# Patient Record
Sex: MALE | Race: BLACK OR AFRICAN AMERICAN
[De-identification: names, ages, dates, MRNs, and addresses within clinical notes are randomized per-mention and may not be internally consistent; named-entity substitution may affect disease eponyms.]

---

## 2019-09-18 LAB
BASOPHILS # BLD AUTO: 0 10*3/UL (ref 0–0.1)
BASOPHILS NFR BLD AUTO: 0.6 % (ref 0–1)
DEPRECATED NEUTROPHILS # BLD AUTO: 2.5 10*3/UL (ref 2.1–6.9)
EOSINOPHIL # BLD AUTO: 0.1 10*3/UL (ref 0–0.4)
EOSINOPHIL NFR BLD AUTO: 2.4 % (ref 0–6)
ERYTHROCYTE [DISTWIDTH] IN CORD BLOOD: 13.1 % (ref 11.7–14.4)
HCT VFR BLD AUTO: 51.2 % (ref 38.2–49.6)
HGB BLD-MCNC: 17.1 G/DL (ref 14–18)
LYMPHOCYTES # BLD: 1.6 10*3/UL (ref 1–3.2)
LYMPHOCYTES NFR BLD AUTO: 32.1 % (ref 18–39.1)
MCH RBC QN AUTO: 29.8 PG (ref 28–32)
MCHC RBC AUTO-ENTMCNC: 33.4 G/DL (ref 31–35)
MCV RBC AUTO: 89.2 FL (ref 81–99)
MONOCYTES # BLD AUTO: 0.7 10*3/UL (ref 0.2–0.8)
MONOCYTES NFR BLD AUTO: 14.3 % (ref 4.4–11.3)
NEUTS SEG NFR BLD AUTO: 50.4 % (ref 38.7–80)
PLATELET # BLD AUTO: 200 X10E3/UL (ref 140–360)
RBC # BLD AUTO: 5.74 X10E6/UL (ref 4.3–5.7)

## 2019-09-23 ENCOUNTER — HOSPITAL ENCOUNTER (OUTPATIENT)
Dept: HOSPITAL 88 - OR | Age: 79
Discharge: HOME | End: 2019-09-23
Attending: INTERNAL MEDICINE
Payer: MEDICARE

## 2019-09-23 VITALS — DIASTOLIC BLOOD PRESSURE: 75 MMHG | SYSTOLIC BLOOD PRESSURE: 110 MMHG

## 2019-09-23 DIAGNOSIS — K64.8: ICD-10-CM

## 2019-09-23 DIAGNOSIS — K63.5: ICD-10-CM

## 2019-09-23 DIAGNOSIS — I10: ICD-10-CM

## 2019-09-23 DIAGNOSIS — Z01.810: ICD-10-CM

## 2019-09-23 DIAGNOSIS — K57.30: ICD-10-CM

## 2019-09-23 DIAGNOSIS — D12.2: ICD-10-CM

## 2019-09-23 DIAGNOSIS — Z12.11: Primary | ICD-10-CM

## 2019-09-23 DIAGNOSIS — Z01.812: ICD-10-CM

## 2019-09-23 DIAGNOSIS — Z86.010: ICD-10-CM

## 2019-09-23 PROCEDURE — 45380 COLONOSCOPY AND BIOPSY: CPT

## 2019-09-23 PROCEDURE — 88305 TISSUE EXAM BY PATHOLOGIST: CPT

## 2019-09-23 PROCEDURE — 45385 COLONOSCOPY W/LESION REMOVAL: CPT

## 2019-09-23 PROCEDURE — 85025 COMPLETE CBC W/AUTO DIFF WBC: CPT

## 2019-09-23 PROCEDURE — 93005 ELECTROCARDIOGRAM TRACING: CPT

## 2019-09-23 PROCEDURE — 45384 COLONOSCOPY W/LESION REMOVAL: CPT

## 2019-09-23 PROCEDURE — 45378 DIAGNOSTIC COLONOSCOPY: CPT

## 2019-09-23 PROCEDURE — 36415 COLL VENOUS BLD VENIPUNCTURE: CPT

## 2019-09-23 NOTE — OPERATIVE REPORT
DATE OF PROCEDURE:  09/23/2019

 

SURGEON:  Jama Brooks MD

 

PROCEDURE:  Colonoscopy and polypectomy.

 

INDICATIONS FOR COLONOSCOPY:  Surveillance colonoscopy, personal history of colon polyps.

 

MEDICATIONS:  The patient was done under MAC, please see anesthesiologist's note.

 

PROCEDURE IN DETAIL:  With the patient in the left lateral decubitus position, the

flexible fiberoptic Olympus colonoscope was inserted into the rectum with ease and

advanced all the way to the cecum.  It was then withdrawn slowly.  Mucosa overlying the

cecum appeared to be within normal limits.  A minute polyp in the ascending colon was

removed per the cold biopsy forceps.  The rest of the ascending and transverse appeared

to be within normal limits.  One polyp was snared from the descending colon.  Some

diverticular disease was noted in the sigmoid colon.  Two polyps were hot biopsied from

the sigmoid colon.  The rectum appeared to be within normal limits.  The scope was then

retroflexed into the distal rectum.  A large internal hemorrhoids were noted, none of

which was actively bleeding.  The scope was then straightened out and was subsequently

withdrawn.  The patient tolerated the procedure well. 

 

IMPRESSION:  

1. Ascending colon polyp removed per cold biopsy forceps.

2. Descending colon polyp snared.

3. Diverticulosis.

4. Sigmoid colon polyps x2 hot biopsied.

5. Internal hemorrhoids, none actively bleeding.

 

PLAN:  Follow up histology.  Initiate high-fiber low-fat diet.  Initiate high-fiber

supplement.  The patient might benefit from a followup colonoscopy in 3 years. 

 

 

 

 

______________________________

Jama Brooks MD

 

[facility] [place]

D:  09/23/2019 12:12:40

T:  09/23/2019 18:21:22

Job #:  [id number]

 

cc:            David Graves MD

## 2019-09-25 ENCOUNTER — HOSPITAL ENCOUNTER (OUTPATIENT)
Dept: HOSPITAL 88 - ER | Age: 79
Setting detail: OBSERVATION
LOS: 1 days | Discharge: HOME | End: 2019-09-26
Attending: INTERNAL MEDICINE | Admitting: INTERNAL MEDICINE
Payer: MEDICARE

## 2019-09-25 VITALS — SYSTOLIC BLOOD PRESSURE: 144 MMHG | DIASTOLIC BLOOD PRESSURE: 65 MMHG

## 2019-09-25 VITALS — DIASTOLIC BLOOD PRESSURE: 89 MMHG | SYSTOLIC BLOOD PRESSURE: 135 MMHG

## 2019-09-25 VITALS — SYSTOLIC BLOOD PRESSURE: 149 MMHG | DIASTOLIC BLOOD PRESSURE: 89 MMHG

## 2019-09-25 VITALS — SYSTOLIC BLOOD PRESSURE: 158 MMHG | DIASTOLIC BLOOD PRESSURE: 88 MMHG

## 2019-09-25 VITALS — DIASTOLIC BLOOD PRESSURE: 88 MMHG | SYSTOLIC BLOOD PRESSURE: 158 MMHG

## 2019-09-25 VITALS — WEIGHT: 190.37 LBS | BODY MASS INDEX: 29.88 KG/M2 | HEIGHT: 67 IN

## 2019-09-25 DIAGNOSIS — E03.9: ICD-10-CM

## 2019-09-25 DIAGNOSIS — K21.9: ICD-10-CM

## 2019-09-25 DIAGNOSIS — K91.840: Primary | ICD-10-CM

## 2019-09-25 DIAGNOSIS — F17.220: ICD-10-CM

## 2019-09-25 DIAGNOSIS — Z86.010: ICD-10-CM

## 2019-09-25 DIAGNOSIS — I10: ICD-10-CM

## 2019-09-25 DIAGNOSIS — E78.5: ICD-10-CM

## 2019-09-25 LAB
ALBUMIN SERPL-MCNC: 3.5 G/DL (ref 3.5–5)
ALBUMIN/GLOB SERPL: 1 {RATIO} (ref 0.8–2)
ALP SERPL-CCNC: 112 IU/L (ref 40–150)
ALT SERPL-CCNC: 17 IU/L (ref 0–55)
ANION GAP SERPL CALC-SCNC: 14.8 MMOL/L (ref 8–16)
BASOPHILS # BLD AUTO: 0 10*3/UL (ref 0–0.1)
BASOPHILS NFR BLD AUTO: 0.5 % (ref 0–1)
BUN SERPL-MCNC: 19 MG/DL (ref 7–26)
BUN/CREAT SERPL: 18 (ref 6–25)
CALCIUM SERPL-MCNC: 9.6 MG/DL (ref 8.4–10.2)
CHLORIDE SERPL-SCNC: 102 MMOL/L (ref 98–107)
CO2 SERPL-SCNC: 24 MMOL/L (ref 22–29)
DEPRECATED APTT PLAS QN: 46.8 SECONDS (ref 23.8–35.5)
DEPRECATED INR PLAS: 0.93
DEPRECATED NEUTROPHILS # BLD AUTO: 2.5 10*3/UL (ref 2.1–6.9)
EGFRCR SERPLBLD CKD-EPI 2021: > 60 ML/MIN (ref 60–?)
EOSINOPHIL # BLD AUTO: 0.1 10*3/UL (ref 0–0.4)
EOSINOPHIL NFR BLD AUTO: 1.5 % (ref 0–6)
ERYTHROCYTE [DISTWIDTH] IN CORD BLOOD: 12.9 % (ref 11.7–14.4)
GLOBULIN PLAS-MCNC: 3.6 G/DL (ref 2.3–3.5)
GLUCOSE SERPLBLD-MCNC: 116 MG/DL (ref 74–118)
HCT VFR BLD AUTO: 44.9 % (ref 38.2–49.6)
HCT VFR BLD AUTO: 44.9 % (ref 38.2–49.6)
HGB BLD-MCNC: 15.5 G/DL (ref 14–18)
HGB BLD-MCNC: 16 G/DL (ref 14–18)
LYMPHOCYTES # BLD: 0.9 10*3/UL (ref 1–3.2)
LYMPHOCYTES NFR BLD AUTO: 22.1 % (ref 18–39.1)
MCH RBC QN AUTO: 30.6 PG (ref 28–32)
MCHC RBC AUTO-ENTMCNC: 35.6 G/DL (ref 31–35)
MCV RBC AUTO: 85.9 FL (ref 81–99)
MONOCYTES # BLD AUTO: 0.5 10*3/UL (ref 0.2–0.8)
MONOCYTES NFR BLD AUTO: 11.8 % (ref 4.4–11.3)
NEUTS SEG NFR BLD AUTO: 63.8 % (ref 38.7–80)
PLATELET # BLD AUTO: 170 X10E3/UL (ref 140–360)
POTASSIUM SERPL-SCNC: 3.8 MMOL/L (ref 3.5–5.1)
PROTHROMBIN TIME: 13 SECONDS (ref 11.9–14.5)
RBC # BLD AUTO: 5.23 X10E6/UL (ref 4.3–5.7)
SODIUM SERPL-SCNC: 137 MMOL/L (ref 136–145)

## 2019-09-25 PROCEDURE — 86900 BLOOD TYPING SEROLOGIC ABO: CPT

## 2019-09-25 PROCEDURE — 85730 THROMBOPLASTIN TIME PARTIAL: CPT

## 2019-09-25 PROCEDURE — 85018 HEMOGLOBIN: CPT

## 2019-09-25 PROCEDURE — 80048 BASIC METABOLIC PNL TOTAL CA: CPT

## 2019-09-25 PROCEDURE — 99284 EMERGENCY DEPT VISIT MOD MDM: CPT

## 2019-09-25 PROCEDURE — 86850 RBC ANTIBODY SCREEN: CPT

## 2019-09-25 PROCEDURE — 36415 COLL VENOUS BLD VENIPUNCTURE: CPT

## 2019-09-25 PROCEDURE — 85014 HEMATOCRIT: CPT

## 2019-09-25 PROCEDURE — 85025 COMPLETE CBC W/AUTO DIFF WBC: CPT

## 2019-09-25 PROCEDURE — 85610 PROTHROMBIN TIME: CPT

## 2019-09-25 PROCEDURE — 96374 THER/PROPH/DIAG INJ IV PUSH: CPT

## 2019-09-25 PROCEDURE — 80053 COMPREHEN METABOLIC PANEL: CPT

## 2019-09-25 RX ADMIN — SODIUM CHLORIDE SCH MG: 900 INJECTION INTRAVENOUS at 09:15

## 2019-09-25 RX ADMIN — SODIUM CHLORIDE SCH MLS/HR: 9 INJECTION, SOLUTION INTRAVENOUS at 17:20

## 2019-09-25 RX ADMIN — SODIUM CHLORIDE SCH MLS/HR: 9 INJECTION, SOLUTION INTRAVENOUS at 09:15

## 2019-09-25 NOTE — NUR
H&P



cc: bloody stool



HPI: 79yoM, PCP , developed bloody stool after returning home and 
taking aspirin 1-2 days after undergoing polypectomy during colonoscopy on 
Monday.  Pt states that 4 polyps were removed.  



PMH: HTN, nicotine dependence by dipping, colonic polyps s/p polypectomy, GERD, 
neuropathy, hypothyroidism

PSHx: appendectomy, polypectomies

Allergies; see emr

FH/SH; ; dips tobacco

meds; see MAR

ROS: no f/c/s/N/V/CABRERA/vision changes/cp/sob/back pain/dizziness



v/s; revd

PE

tired appearing

anicteric

ns1s2

mod bs

soft nt nd

no e/t 

a&ox3; ley

skin dry

n. affect



labs/meds; revd



A/P: 79yoM



GIB post polypectomy

Colonic polyps s/p polypectomies

HTN

Hypothyroidism

GERD



PLAN

IVF; hold asa; gi eval

Monitor H/H; IV ppi

SCD

dispo; 



Asif Doe MD, PhD

## 2019-09-25 NOTE — NUR
rounded with night shift nurse, patient aware of change and in no distress. call bell within 
reach and bed in lowest position.

## 2019-09-25 NOTE — NUR
patient arrived to unit via wheelchair, alert and oriented and in no distress. call bell 
within reach and bed in lowest position.

## 2019-09-26 VITALS — SYSTOLIC BLOOD PRESSURE: 135 MMHG | DIASTOLIC BLOOD PRESSURE: 83 MMHG

## 2019-09-26 VITALS — DIASTOLIC BLOOD PRESSURE: 82 MMHG | SYSTOLIC BLOOD PRESSURE: 146 MMHG

## 2019-09-26 VITALS — SYSTOLIC BLOOD PRESSURE: 138 MMHG | DIASTOLIC BLOOD PRESSURE: 84 MMHG

## 2019-09-26 VITALS — SYSTOLIC BLOOD PRESSURE: 123 MMHG | DIASTOLIC BLOOD PRESSURE: 62 MMHG

## 2019-09-26 VITALS — DIASTOLIC BLOOD PRESSURE: 84 MMHG | SYSTOLIC BLOOD PRESSURE: 138 MMHG

## 2019-09-26 VITALS — SYSTOLIC BLOOD PRESSURE: 122 MMHG | DIASTOLIC BLOOD PRESSURE: 73 MMHG

## 2019-09-26 LAB
ANION GAP SERPL CALC-SCNC: 12.9 MMOL/L (ref 8–16)
BUN SERPL-MCNC: 11 MG/DL (ref 7–26)
BUN/CREAT SERPL: 13 (ref 6–25)
CALCIUM SERPL-MCNC: 9.3 MG/DL (ref 8.4–10.2)
CHLORIDE SERPL-SCNC: 106 MMOL/L (ref 98–107)
CO2 SERPL-SCNC: 22 MMOL/L (ref 22–29)
EGFRCR SERPLBLD CKD-EPI 2021: > 60 ML/MIN (ref 60–?)
GLUCOSE SERPLBLD-MCNC: 81 MG/DL (ref 74–118)
HCT VFR BLD AUTO: 42.3 % (ref 38.2–49.6)
HCT VFR BLD AUTO: 42.6 % (ref 38.2–49.6)
HCT VFR BLD AUTO: 43.3 % (ref 38.2–49.6)
HCT VFR BLD AUTO: 44.1 % (ref 38.2–49.6)
HGB BLD-MCNC: 14.6 G/DL (ref 14–18)
HGB BLD-MCNC: 14.9 G/DL (ref 14–18)
HGB BLD-MCNC: 15.3 G/DL (ref 14–18)
HGB BLD-MCNC: 15.3 G/DL (ref 14–18)
POTASSIUM SERPL-SCNC: 3.9 MMOL/L (ref 3.5–5.1)
SODIUM SERPL-SCNC: 137 MMOL/L (ref 136–145)

## 2019-09-26 RX ADMIN — SODIUM CHLORIDE SCH MG: 900 INJECTION INTRAVENOUS at 08:00

## 2019-09-26 RX ADMIN — SODIUM CHLORIDE SCH MLS/HR: 9 INJECTION, SOLUTION INTRAVENOUS at 09:05

## 2019-09-26 RX ADMIN — TRAMADOL HYDROCHLORIDE PRN MG: 50 TABLET, FILM COATED ORAL at 01:43

## 2019-09-26 RX ADMIN — Medication SCH MG: at 15:30

## 2019-09-26 RX ADMIN — Medication SCH MG: at 08:00

## 2019-09-26 RX ADMIN — TRAMADOL HYDROCHLORIDE PRN MG: 50 TABLET, FILM COATED ORAL at 08:00

## 2019-09-26 RX ADMIN — SODIUM CHLORIDE SCH MLS/HR: 9 INJECTION, SOLUTION INTRAVENOUS at 01:24

## 2019-09-26 RX ADMIN — SODIUM CHLORIDE SCH MLS/HR: 9 INJECTION, SOLUTION INTRAVENOUS at 16:45
